# Patient Record
Sex: FEMALE | Race: BLACK OR AFRICAN AMERICAN | Employment: FULL TIME | URBAN - METROPOLITAN AREA
[De-identification: names, ages, dates, MRNs, and addresses within clinical notes are randomized per-mention and may not be internally consistent; named-entity substitution may affect disease eponyms.]

---

## 2022-02-22 ENCOUNTER — APPOINTMENT (OUTPATIENT)
Dept: CT IMAGING | Age: 62
End: 2022-02-22
Attending: STUDENT IN AN ORGANIZED HEALTH CARE EDUCATION/TRAINING PROGRAM
Payer: COMMERCIAL

## 2022-02-22 ENCOUNTER — HOSPITAL ENCOUNTER (EMERGENCY)
Age: 62
Discharge: HOME | End: 2022-02-22
Attending: STUDENT IN AN ORGANIZED HEALTH CARE EDUCATION/TRAINING PROGRAM
Payer: COMMERCIAL

## 2022-02-22 VITALS
DIASTOLIC BLOOD PRESSURE: 116 MMHG | RESPIRATION RATE: 16 BRPM | BODY MASS INDEX: 33.95 KG/M2 | SYSTOLIC BLOOD PRESSURE: 202 MMHG | OXYGEN SATURATION: 98 % | HEART RATE: 72 BPM | HEIGHT: 68 IN | TEMPERATURE: 98 F | WEIGHT: 224 LBS

## 2022-02-22 DIAGNOSIS — R10.11 ABDOMINAL PAIN, RIGHT UPPER QUADRANT: Primary | ICD-10-CM

## 2022-02-22 LAB
ALBUMIN SERPL-MCNC: 3.3 G/DL (ref 3.4–5)
ALBUMIN/GLOB SERPL: 0.7 {RATIO} (ref 0.8–1.7)
ALP SERPL-CCNC: 109 U/L (ref 45–117)
ALT SERPL-CCNC: 16 U/L (ref 13–56)
ANION GAP SERPL CALC-SCNC: 4 MMOL/L (ref 3–18)
AST SERPL-CCNC: 13 U/L (ref 10–38)
ATRIAL RATE: 69 BPM
BASOPHILS # BLD: 0 K/UL (ref 0–0.1)
BASOPHILS NFR BLD: 0 % (ref 0–2)
BILIRUB SERPL-MCNC: 0.5 MG/DL (ref 0.2–1)
BUN SERPL-MCNC: 14 MG/DL (ref 7–18)
BUN/CREAT SERPL: 21 (ref 12–20)
CALCIUM SERPL-MCNC: 8.8 MG/DL (ref 8.5–10.1)
CALCULATED P AXIS, ECG09: 33 DEGREES
CALCULATED R AXIS, ECG10: 19 DEGREES
CHLORIDE SERPL-SCNC: 108 MMOL/L (ref 100–111)
CO2 SERPL-SCNC: 28 MMOL/L (ref 21–32)
CREAT SERPL-MCNC: 0.68 MG/DL (ref 0.6–1.3)
DIAGNOSIS, 93000: NORMAL
DIFFERENTIAL METHOD BLD: NORMAL
EOSINOPHIL # BLD: 0.1 K/UL (ref 0–0.4)
EOSINOPHIL NFR BLD: 2 % (ref 0–5)
ERYTHROCYTE [DISTWIDTH] IN BLOOD BY AUTOMATED COUNT: 13.3 % (ref 11.6–14.5)
GLOBULIN SER CALC-MCNC: 4.5 G/DL (ref 2–4)
GLUCOSE SERPL-MCNC: 92 MG/DL (ref 74–99)
HCT VFR BLD AUTO: 40.1 % (ref 35–45)
HGB BLD-MCNC: 13.5 G/DL (ref 12–16)
IMM GRANULOCYTES # BLD AUTO: 0 K/UL (ref 0–0.04)
IMM GRANULOCYTES NFR BLD AUTO: 0 % (ref 0–0.5)
LIPASE SERPL-CCNC: 59 U/L (ref 73–393)
LYMPHOCYTES # BLD: 2.3 K/UL (ref 0.9–3.6)
LYMPHOCYTES NFR BLD: 41 % (ref 21–52)
MCH RBC QN AUTO: 27.4 PG (ref 24–34)
MCHC RBC AUTO-ENTMCNC: 33.7 G/DL (ref 31–37)
MCV RBC AUTO: 81.3 FL (ref 78–100)
MONOCYTES # BLD: 0.4 K/UL (ref 0.05–1.2)
MONOCYTES NFR BLD: 8 % (ref 3–10)
NEUTS SEG # BLD: 2.7 K/UL (ref 1.8–8)
NEUTS SEG NFR BLD: 49 % (ref 40–73)
NRBC # BLD: 0 K/UL (ref 0–0.01)
NRBC BLD-RTO: 0 PER 100 WBC
P-R INTERVAL, ECG05: 210 MS
PLATELET # BLD AUTO: 398 K/UL (ref 135–420)
PMV BLD AUTO: 11 FL (ref 9.2–11.8)
POTASSIUM SERPL-SCNC: 4 MMOL/L (ref 3.5–5.5)
PROT SERPL-MCNC: 7.8 G/DL (ref 6.4–8.2)
Q-T INTERVAL, ECG07: 446 MS
QRS DURATION, ECG06: 82 MS
QTC CALCULATION (BEZET), ECG08: 477 MS
RBC # BLD AUTO: 4.93 M/UL (ref 4.2–5.3)
SODIUM SERPL-SCNC: 140 MMOL/L (ref 136–145)
TROPONIN-HIGH SENSITIVITY: 10 NG/L (ref 0–54)
TROPONIN-HIGH SENSITIVITY: 9 NG/L (ref 0–54)
VENTRICULAR RATE, ECG03: 69 BPM
WBC # BLD AUTO: 5.6 K/UL (ref 4.6–13.2)

## 2022-02-22 PROCEDURE — 93005 ELECTROCARDIOGRAM TRACING: CPT

## 2022-02-22 PROCEDURE — 80053 COMPREHEN METABOLIC PANEL: CPT

## 2022-02-22 PROCEDURE — 74177 CT ABD & PELVIS W/CONTRAST: CPT

## 2022-02-22 PROCEDURE — 99283 EMERGENCY DEPT VISIT LOW MDM: CPT

## 2022-02-22 PROCEDURE — 83690 ASSAY OF LIPASE: CPT

## 2022-02-22 PROCEDURE — 85025 COMPLETE CBC W/AUTO DIFF WBC: CPT

## 2022-02-22 PROCEDURE — 84484 ASSAY OF TROPONIN QUANT: CPT

## 2022-02-22 PROCEDURE — 74011000636 HC RX REV CODE- 636: Performed by: STUDENT IN AN ORGANIZED HEALTH CARE EDUCATION/TRAINING PROGRAM

## 2022-02-22 RX ORDER — ERGOCALCIFEROL 1.25 MG/1
50000 CAPSULE ORAL
COMMUNITY

## 2022-02-22 RX ORDER — METHOCARBAMOL 750 MG/1
750 TABLET, FILM COATED ORAL
Qty: 14 TABLET | Refills: 0 | Status: SHIPPED | OUTPATIENT
Start: 2022-02-22 | End: 2022-03-08

## 2022-02-22 RX ORDER — LIDOCAINE 50 MG/G
PATCH TOPICAL
Qty: 14 EACH | Refills: 0 | Status: SHIPPED | OUTPATIENT
Start: 2022-02-22 | End: 2022-03-29

## 2022-02-22 RX ADMIN — IOPAMIDOL 100 ML: 612 INJECTION, SOLUTION INTRAVENOUS at 12:32

## 2022-02-22 NOTE — ED PROVIDER NOTES
EMERGENCY DEPARTMENT HISTORY AND PHYSICAL EXAM      Date: 2/22/2022  Patient Name: Mariza Moore    History of Presenting Illness     Chief Complaint   Patient presents with    Abdominal Pain       History (Context): Marzia Moore is a 64 y.o. female with a past medical history significant for pretension right upper quadrant abdominal pain. Patient states abdominal pain began this morning. She denies any inciting event for her symptoms. She states pain described as sharp, nonradiating, and without any alleviating or exacerbating factors. Patient states he is never had pain like this previously. She was seen at an urgent care and was told to come to the emergency department for possible comes into the ED today due to cholecystitis. Patient denies any fever, chills, chest pain, dyspnea, nausea, vomiting, or diarrhea. She states she is not take any medication for treatment of symptoms prior to arrival.  Patient describes her symptoms as severe initially however has improved since onset      PCP: CARTER Nieves    Current Outpatient Medications   Medication Sig Dispense Refill    ergocalciferol (Vitamin D2) 1,250 mcg (50,000 unit) capsule Take 50,000 Units by mouth every seven (7) days.  amLODIPine (NORVASC) 5 mg tablet Take 5 mg by mouth daily. Past History     Past Medical History:   Past Medical History:   Diagnosis Date    Hypertension        Past Surgical History:  History reviewed. No pertinent surgical history. Family History:  History reviewed. No pertinent family history. Social History:   Social History     Tobacco Use    Smoking status: Never Smoker    Smokeless tobacco: Never Used   Substance Use Topics    Alcohol use: No    Drug use: No       Allergies: Allergies   Allergen Reactions    Codeine Swelling       PMH, PSH, family history, social history, allergies reviewed with the patient with significant items noted above.   Review of Systems   Review of Systems   Constitutional: Negative for chills and fever. HENT: Negative for sore throat. Eyes: Negative for visual disturbance. Respiratory: Negative for shortness of breath. Cardiovascular: Negative for chest pain. Gastrointestinal: Positive for abdominal pain. Negative for nausea and vomiting. Genitourinary: Negative for difficulty urinating. Musculoskeletal: Negative for myalgias. Skin: Negative for rash. Neurological: Negative for headaches. Physical Exam     Vitals:    02/22/22 1103   BP: (!) 202/116   Pulse: 72   Resp: 16   Temp: 98 °F (36.7 °C)   SpO2: 98%   Weight: 101.6 kg (224 lb)   Height: 5' 7.5\" (1.715 m)       Physical Exam  Vitals and nursing note reviewed. Constitutional:       General: She is not in acute distress. Appearance: Normal appearance. She is not ill-appearing or toxic-appearing. HENT:      Head: Normocephalic and atraumatic. Mouth/Throat:      Mouth: Mucous membranes are moist.   Eyes:      General: No scleral icterus. Conjunctiva/sclera: Conjunctivae normal.   Cardiovascular:      Rate and Rhythm: Normal rate and regular rhythm. Comments: Normal peripheral perfusion  Pulmonary:      Effort: Pulmonary effort is normal.      Breath sounds: Normal breath sounds. Abdominal:      General: There is no distension. Palpations: Abdomen is soft. Tenderness: There is abdominal tenderness (Mild right upper quadrant tenderness to palpation). There is no guarding or rebound. Comments: Negative Revaes's and McBurney's   Musculoskeletal:         General: No deformity. Normal range of motion. Cervical back: Normal range of motion and neck supple. Skin:     General: Skin is warm and dry. Findings: No rash. Neurological:      General: No focal deficit present. Mental Status: She is alert and oriented to person, place, and time. Mental status is at baseline.    Psychiatric:         Mood and Affect: Mood normal. Thought Content: Thought content normal.         Diagnostic Study Results     Labs -     Recent Results (from the past 12 hour(s))   EKG, 12 LEAD, INITIAL    Collection Time: 02/22/22 11:32 AM   Result Value Ref Range    Ventricular Rate 69 BPM    Atrial Rate 69 BPM    P-R Interval 210 ms    QRS Duration 82 ms    Q-T Interval 446 ms    QTC Calculation (Bezet) 477 ms    Calculated P Axis 33 degrees    Calculated R Axis 19 degrees    Diagnosis       Sinus rhythm with 1st degree AV block  Minimal voltage criteria for LVH, may be normal variant  Cannot rule out Anterior infarct , age undetermined  Abnormal ECG  No previous ECGs available  Confirmed by Humberto Rosales MD, --- ([id number]) on 2/22/2022 12:59:55 PM     CBC WITH AUTOMATED DIFF    Collection Time: 02/22/22 11:35 AM   Result Value Ref Range    WBC 5.6 4.6 - 13.2 K/uL    RBC 4.93 4.20 - 5.30 M/uL    HGB 13.5 12.0 - 16.0 g/dL    HCT 40.1 35.0 - 45.0 %    MCV 81.3 78.0 - 100.0 FL    MCH 27.4 24.0 - 34.0 PG    MCHC 33.7 31.0 - 37.0 g/dL    RDW 13.3 11.6 - 14.5 %    PLATELET 585 851 - 739 K/uL    MPV 11.0 9.2 - 11.8 FL    NRBC 0.0 0  WBC    ABSOLUTE NRBC 0.00 0.00 - 0.01 K/uL    NEUTROPHILS 49 40 - 73 %    LYMPHOCYTES 41 21 - 52 %    MONOCYTES 8 3 - 10 %    EOSINOPHILS 2 0 - 5 %    BASOPHILS 0 0 - 2 %    IMMATURE GRANULOCYTES 0 0.0 - 0.5 %    ABS. NEUTROPHILS 2.7 1.8 - 8.0 K/UL    ABS. LYMPHOCYTES 2.3 0.9 - 3.6 K/UL    ABS. MONOCYTES 0.4 0.05 - 1.2 K/UL    ABS. EOSINOPHILS 0.1 0.0 - 0.4 K/UL    ABS. BASOPHILS 0.0 0.0 - 0.1 K/UL    ABS. IMM.  GRANS. 0.0 0.00 - 0.04 K/UL    DF AUTOMATED     METABOLIC PANEL, COMPREHENSIVE    Collection Time: 02/22/22 11:35 AM   Result Value Ref Range    Sodium 140 136 - 145 mmol/L    Potassium 4.0 3.5 - 5.5 mmol/L    Chloride 108 100 - 111 mmol/L    CO2 28 21 - 32 mmol/L    Anion gap 4 3.0 - 18 mmol/L    Glucose 92 74 - 99 mg/dL    BUN 14 7.0 - 18 MG/DL    Creatinine 0.68 0.6 - 1.3 MG/DL    BUN/Creatinine ratio 21 (H) 12 - 20      GFR est AA >60 >60 ml/min/1.73m2    GFR est non-AA >60 >60 ml/min/1.73m2    Calcium 8.8 8.5 - 10.1 MG/DL    Bilirubin, total 0.5 0.2 - 1.0 MG/DL    ALT (SGPT) 16 13 - 56 U/L    AST (SGOT) 13 10 - 38 U/L    Alk. phosphatase 109 45 - 117 U/L    Protein, total 7.8 6.4 - 8.2 g/dL    Albumin 3.3 (L) 3.4 - 5.0 g/dL    Globulin 4.5 (H) 2.0 - 4.0 g/dL    A-G Ratio 0.7 (L) 0.8 - 1.7     TROPONIN-HIGH SENSITIVITY    Collection Time: 02/22/22 11:35 AM   Result Value Ref Range    Troponin-High Sensitivity 10 0 - 54 ng/L   LIPASE    Collection Time: 02/22/22 11:35 AM   Result Value Ref Range    Lipase 59 (L) 73 - 393 U/L      Labs Reviewed   METABOLIC PANEL, COMPREHENSIVE - Abnormal; Notable for the following components:       Result Value    BUN/Creatinine ratio 21 (*)     Albumin 3.3 (*)     Globulin 4.5 (*)     A-G Ratio 0.7 (*)     All other components within normal limits   LIPASE - Abnormal; Notable for the following components:    Lipase 59 (*)     All other components within normal limits   CBC WITH AUTOMATED DIFF   TROPONIN-HIGH SENSITIVITY   TROPONIN-HIGH SENSITIVITY       Radiologic Studies -   CT ABD PELV W CONT   Final Result      No explanation for the patient's pain. Hepatic steatosis. Mildly enlarged lymph node mely hepatis of uncertain significance, likely   normal for this patient. Fibroid uterus. Possible nodule left breast asymmetric when compared with the   right side. Question is the patient current on her breast cancer screening? If   not, advise diagnostic mammogram and ultrasound to further evaluate. No   mammograms in the PACS system for correlation. CT Results  (Last 48 hours)               02/22/22 1231  CT ABD PELV W CONT Final result    Impression:      No explanation for the patient's pain. Hepatic steatosis. Mildly enlarged lymph node mely hepatis of uncertain significance, likely   normal for this patient. Fibroid uterus.  Possible nodule left breast asymmetric when compared with the   right side. Question is the patient current on her breast cancer screening? If   not, advise diagnostic mammogram and ultrasound to further evaluate. No   mammograms in the PACS system for correlation. Narrative:  CT of the Abdomen and Pelvis With Contrast       CPT CODE: 84899       CLINICAL HISTORY: Left upper quadrant abdominal pain. Normal white blood cell   count. .       TECHNIQUE: After administration of oral and nonionic intravenous contrast, 5 mm   helical scan was obtained of the abdomen and pelvis. Sagittal and coronal   reformations then created with the original data set. All CT scans at this   facility are performed using dose optimization techniques as appropriate to a   performed exam, to include automated exposure control, adjustment of the mA   and/or kV according to patient's size (including appropriate matching for site   specific examinations), or use of iterative reconstruction technique. COMPARISON:       FINDINGS:        Lung bases: Clear. No effusions. Heart and pericardium: Normal.        Liver: Overall low in attenuation. No focal lesions No focal lesions. Gallbladder/biliary: No calcified stones or wall thickening. Spleen: Normal density and size. No focal lesions. Pancreas: Normal enhancement. No duct dilatation. Adrenals: Normal.        Kidneys:  Normally enhancing. No focal lesions. No striated nephrogram. No   hydronephrosis or nephrolithiasis. Retroperitoneum: Great vessels unremarkable. Lymph nodes: There is enlarged lymph node at the mely hepatis measuring 14 mm. Portacaval node measures 8 mm. No retroperitoneal or mesenteric adenopathy. No   pelvic or groin. Bowel: Stomach, small bowel, appendix are normal.  Colon is normal.        Peritoneal space: No free fluid. :  Myometrial lesion left side uterus measures 24 mm.  Exophytic myometrial   lesion right-sided uterus with claw of surrounding uterine tissue also   compatible with leiomyoma. Third myometrial lesion at the dome measures 17 mm. No adnexal masses. Urinary bladder is normal.       Abdominal wall/MSK: No hernias. No acute abnormalities at skeleton. Low chest   wall/ribs intact. CXR Results  (Last 48 hours)    None          The laboratory results, imaging results, and other diagnostic exams were reviewed in the EMR. Medical Decision Making   I am the first provider for this patient. I reviewed the vital signs, available nursing notes, past medical history, past surgical history, family history and social history. Vital Signs-Reviewed the patient's vital signs. ED EKG interpretation:  Rhythm: normal sinus rhythm; and regular . Rate (approx.): 69; Axis: normal; P wave: prolonged; QRS interval: normal ; ST/T wave: non-specific changes; Other findings: abnormal ekg. This EKG was interpreted by Sukhdeep Ba D.O. Records Reviewed: Personally, on initial evaluation    MDM:   Dorita Ano presents with complaint of right upper quadrant abdominal pain  DDX includes but is not limited to: Cholecystitis, abdominal pain, appendicitis, muscle strain    Patient over well-appearing, no acute distress, vital signs grossly within normal limits. Will obtain lab work and imaging for further evaluation of patients complaint. Will continue to monitor and evaluate patient while in the ED. Orders as below:  Orders Placed This Encounter    CT ABD PELV W CONT    CBC WITH AUTOMATED DIFF    COMPREHENSIVE METABOLIC PANEL    TROPONIN-HIGH SENSITIVITY    LIPASE    TROPONIN-HIGH SENSITIVITY    EKG, 12 LEAD, INITIAL    ergocalciferol (Vitamin D2) 1,250 mcg (50,000 unit) capsule    iopamidoL (ISOVUE 300) 61 % contrast injection 100 mL        ED Course:   ED Course as of 02/22/22 1444   Tue Feb 22, 2022   1254 Patient CT scan does not show any acute intra-abdominal abnormalities.   Fibroid uterus with a possible nodule to the left breast when compared to the right side. We will continue to monitor patient. [DV]   1146 Patient's repeat troponin stable. Will discharge patient home with return precautions and follow-up recommendations. Patient verbalized understanding is without any further questions. [DV]      ED Course User Index  [DV] Elvin Resendez DO           Procedures:  Procedures        Diagnosis and Disposition     CLINICAL IMPRESSION:  No diagnosis found. Current Discharge Medication List          Disposition: Home    Patient condition at time of disposition: Stable    DISCHARGE NOTE:   Pt has been reexamined. Patient has no new complaints, changes, or physical findings. Care plan outlined and precautions discussed. Results were reviewed with the patient. All medications were reviewed with the patient. All of pt's questions and concerns were addressed. Alarm symptoms and return precautions associated with chief complaint and evaluation were reviewed with the patient in detail. The patient demonstrated adequate understanding. Patient was instructed to follow up with PCP, as well as strict return precautions to the ED upon further deterioration. Patient is ready to go home. The patient is happy with this plan          Dragon Disclaimer     Please note that this dictation was completed with Tutor Trove, the computer voice recognition software. Quite often unanticipated grammatical, syntax, homophones, and other interpretive errors are inadvertently transcribed by the computer software. Please disregard these errors. Please excuse any errors that have escaped final proofreading. Kamran GROMAN.

## 2022-02-22 NOTE — ED NOTES
Jose Moore is a 64 y.o. female that was discharged in stable condition. The patients diagnosis, condition and treatment were explained to  patient and aftercare instructions were given. The patient verbalized understanding. Patient armband removed and shredded.

## 2022-02-22 NOTE — ED TRIAGE NOTES
Patient c/o right upper quadrant abdominal pain that began on awakening today. She states being evaluated at [facility] and advised to report to ER for further evaluation of condition. She denies vomiting, diarrhea or constipation. States last bowel movement was yesterday.

## 2022-08-04 ENCOUNTER — HOSPITAL ENCOUNTER (OUTPATIENT)
Dept: PHYSICAL THERAPY | Age: 62
Discharge: HOME | End: 2022-08-04
Payer: COMMERCIAL

## 2022-08-04 PROCEDURE — 97530 THERAPEUTIC ACTIVITIES: CPT

## 2022-08-04 PROCEDURE — 97162 PT EVAL MOD COMPLEX 30 MIN: CPT

## 2022-08-04 PROCEDURE — 97110 THERAPEUTIC EXERCISES: CPT

## 2022-08-04 NOTE — PROGRESS NOTES
[facility] - Tanisha Serrano  [address] [facility]  [phone] fax    Plan of Care/ Statement of Necessity for Physical Therapy Services    Patient name: Thuy Gutierrez Start of Care: 2022   Referral source: No ref. 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 Assessment/ key information: Patient is a 68-year-old female who presents to therapy with chief complaint of knee pain and right sided weakness following CVA in 2022. She had home health therapy following. Patient reports when she stands or gets out of bed her knee will \"pop\" and her right side is sore. She reports navigating steps sideways with use of handrails. She reports an ache in her hip with sensation of it \"giving out\". Symptoms aggravated with prolonged standing (> 3 hours) and prolonged sitting (>10-15 min); symptoms reduced with walking slowly and ambulating with cart. Patient presents to therapy ambulating with SPC with increased lateral weight shift and knee flex B. She stands with increased left knee flex and right tibial ER. Patient demonstrates decreased right LE strength most noted with hip flex (3-/5) and knee flex (3+/5). She demonstrates decreased right knee AROM measured at 76 deg flex and -4 deg ext (vs 102 deg flex and -6 deg ext on right).  Min gastroc tightness on right; mod left gastroc and B hamstring tightness with pain on right. Min right knee edema noted measured at 1.5cm greater than left at joint line. Patient with TTP to medial gastroc head with tightness noted to lateral gastroc head. Anterior drawer, posterior drawer, and valgus/varus testing at 0 deg negative; min increased laxity noted with Lachman's test. Patient able to maintain MSR stance for 10 sec B. She navigates stairs with step to pattern with use of handrail at quarter turn to handrail to descend. Patient would benefit from skilled outpatient PT to address above mentioned deficits to return to prior level of function, increase independence with ADLs, and improve overall quality of life. Vasopnuematic compression justification:  Per bilateral girth measures taken and listed above the edema is considered significant and having an impact on the patient's strength, balance, gait, transfers, self care, and ADLs    Evaluation Complexity History HIGH Complexity :3+ comorbidities / personal factors will impact the outcome/ POC ; Examination HIGH Complexity : 4+ Standardized tests and measures addressing body structure, function, activity limitation and / or participation in recreation  ;Presentation MEDIUM Complexity : Evolving with changing characteristics  ; Clinical Decision Making MEDIUM Complexity : FOTO score of 26-74  Overall Complexity Rating: MEDIUM  Problem List: pain affecting function, decrease ROM, decrease strength, edema affecting function, impaired gait/ balance, decrease ADL/ functional abilitiies, decrease activity tolerance, decrease flexibility/ joint mobility, and decrease transfer abilities   Treatment Plan may include any combination of the following: Therapeutic exercise, Therapeutic activities, Neuromuscular re-education, Physical agent/modality, Gait/balance training, Manual therapy, Patient education, Self Care training, Functional mobility training, Home safety training, and Stair training  Patient / Family readiness to learn indicated by: asking questions, trying to perform skills, and interest  Persons(s) to be included in education: patient (P)  Barriers to Learning/Limitations: None  Patient Goal (s): to walk again on my own  Patient Self Reported Health Status: fair  Rehabilitation Potential: good    Short Term Goals: To be accomplished in 1 weeks:  1. Patient will report compliance with initial HEP to optimize therapy outcomes. Status at eval: established    Long Term Goals: To be accomplished in 6 weeks:  1. Patient will improve FOTO score by at least 5 points in order to demonstrate functional improvement. Status at eval: 63/100               2. Patient will report no more than \"little difficulty\" with \"standing for 1 hour\" with FOTO in order to demonstrate improved tolerance to work tasks. Status at eval: \"moderate difficulty\"   3. Patient will improve right hip and knee flex MMT to at least 4/5 with MMT in order to perform functional tasks with increased ease. Status at eval: hip flex 3-/4, knee flex 3+/5   4. Patient will improve right knee flex AROM to at least 95 deg in order to navigate stairs with increased ease. Status at eval: 76 deg   5. Patient will be able to maintain MSR stance for at least 30 sec B in order to perform self care tasks with increased ease. Status at eval: 10 sec B    Frequency / Duration: Patient to be seen 2-3 times per week for 6 weeks. Patient/  Caregiver education and instruction: Diagnosis, prognosis, self care, activity modification, and exercises   [x]  Plan of care has been reviewed with ALISON Rodriguez PT 8/4/2022 10:18 AM    ________________________________________________________________________    I certify that the above Therapy Services are being furnished while the patient is under my care. I agree with the treatment plan and certify that this therapy is necessary.     [address] [facility] Signature:____________Date:_________TIME:________     No ref.  provider found  ** Signature, Date and Time must be completed for valid certification **    Please sign and return to [facility] - [facility] [employer] [facility]  [address] [place] [address]  [phone] fax

## 2022-08-04 NOTE — PROGRESS NOTES
PT DAILY TREATMENT NOTE/KNEE EVAL     Patient Name: Julian Dowd  Date:2022  : 1960  [x]  Patient  Verified  Payor: Balbina Felipe / Plan: Vimal Ca / Product Type: HMO /    In time:12:49P  Out time:1:34P  Total Treatment Time (min): 45  Visit #: 1 of 18    Medicare/BCBS Only   Total Timed Codes (min):  25 1:1 Treatment Time:  45     Treatment Area: Right knee pain [M25.561]  Weakness of right lower extremity [R29.898]    SUBJECTIVE  Pain Level (0-10 scale): 6/10 (at worst 10/10, at best 0/10)  []constant []intermittent []improving []worsening []no change since onset    Any medication changes, allergies to medications, adverse drug reactions, diagnosis change, or new procedure performed?: [x] No    [] Yes (see summary sheet for update)  Subjective functional status/changes:       Comorbidities: HTN   Prior Level of Function: ambulating without AD; working as teacher for [employer]); lives in 1 [place] home with 3 steps to enter with son; enjoys doing community service with [facility]    The Plan of Care and following information is based on the information from the initial evaluation. Assessment/ key information: Patient is a 70-year-old female who presents to therapy with chief complaint of knee pain and right sided weakness following CVA in 2022. She had home health therapy following. Patient reports when she stands or gets out of bed her knee will \"pop\" and her right side is sore. She reports navigating steps sideways with use of handrails. She reports an ache in her hip with sensation of it \"giving out\". Symptoms aggravated with prolonged standing (> 3 hours) and prolonged sitting (>10-15 min); symptoms reduced with walking slowly and ambulating with cart. Patient presents to therapy ambulating with SPC with increased lateral weight shift and knee flex B. She stands with increased left knee flex and right tibial ER.  Patient demonstrates decreased right LE strength most noted with hip flex (3-/5) and knee flex (3+/5). She demonstrates decreased right knee AROM measured at 76 deg flex and -4 deg ext (vs 102 deg flex and -6 deg ext on right). Min gastroc tightness on right; mod left gastroc and B hamstring tightness with pain on right. Min right knee edema noted measured at 1.5cm greater than left at joint line. Patient with TTP to medial gastroc head with tightness noted to lateral gastroc head. Anterior drawer, posterior drawer, and valgus/varus testing at 0 deg negative; min increased laxity noted with Lachman's test. Patient able to maintain MSR stance for 10 sec B. She navigates stairs with step to pattern with use of handrail at quarter turn to handrail to descend. Patient would benefit from skilled outpatient PT to address above mentioned deficits to return to prior level of function, increase independence with ADLs, and improve overall quality of life.       25 min [x]Eval                  []Re-Eval       10 min Therapeutic Exercise:  [] See flow sheet : HEP review   Rationale: increase ROM, increase strength, improve coordination, improve balance, and increase proprioception to improve the patients ability to perform ADLs with increased ease    15 min Therapeutic Activity:  []  See flow sheet : patient education   Rationale: increase ROM, increase strength, improve coordination, improve balance, and increase proprioception  to improve the patients ability to perform ADLs with increased ease           With   [] TE   [x] TA   [] neuro   [] other: Patient Education: [x] Review HEP    [] Progressed/Changed HEP based on:   [] positioning   [] body mechanics   [] transfers   [] heat/ice application    [x] other: diagnosis, prognosis, POC, purpose and importance of therapy; anatomy and physiology as it relates to current condition; continued use of SPC due to increased weakness/instability; gradually increasing walking tolerance; skilled outpatient PT to address UE function Other Objective/Functional Measures:     Physical Therapy Evaluation - Knee    Posture: [] Varus    [] Valgus    [] Recurvatum        [] Tibial Torsion    [] Foot Supination    [] Foot Pronation    Describe:    Gait:  [] Normal    [] Abnormal    [] Antalgic    [] NWB    Device:    Describe:    ROM / Strength  [] Unable to assess                  AROM                      PROM                   Strength (1-5)    Left Right Left Right Left Right   Hip Flexion    3- 4     Extension          Abduction          Adduction         Knee Flexion 417 59  3+ 4+     Extension -6 -4  4+ 5    Ankle Plantarflexion (in seated)    4+ 5     Dorsiflexion    5 5    Right  hip ER 4 IR 4-  Left hip ER 4 IR 5    Flexibility: [] Unable to assess at this time  Hamstrings:    (L) Tightness= [] WNL   [] Min   [] Mod   [] Severe    (R) Tightness= [] WNL   [] Min   [] Mod   [] Severe  Quadriceps:    (L) Tightness= [] WNL   [] Min   [x] Mod   [] Severe    (R) Tightness= [] WNL   [] Min   [x] Mod (with pain)  [] Severe  Gastroc:      (L) Tightness= [] WNL   [] Min   [x] Mod   [] Severe    (R) Tightness= [] WNL   [x] Min   [] Mod   [] Severe  Other:    Palpation:   Neg/Pos  Neg/Pos  Neg/Pos   Joint Line  Quad tendon  Patellar ligament    Patella  Fibular head  Pes Anserinus    Tibial tubercle  Hamstring tendons  Infrapatellar fat pad      Optional Tests:  Patellar Positioning (Static)   []L []R Normal []L []R Lateral   []L []R Ricardo Cunas      []L []R Medial   []L []R [place]    Patellar Tracking   []L []R Glide (Lat)   []L []R Tilt (Lat)     []L []R Glide (Med)  []L []R Tilt (Med)      []L []R Tile (Inf)     Patellar Mobility   []L []R Hypermobile []L []R Hypomobile         Girth Measurements:     Cm at  Joint line  Cm above joint line   Cm at   Cm below joint line  Cm at joint line   Left 48.5       Right  50         Lachmans  [x] Neg    [] Pos Posterior Drawer [x] Neg    [] Pos  Pivot Shift  [] Neg    [] Pos Posterior Sag  [] Neg    [] Pos  NEVIN   [] Neg    [] Pos Sally's Test [] Neg    [] Pos  ALRI   [] Neg    [] Pos Squat   [] Neg    [] Pos  Valgus@ 0 Degrees [x] Neg    [] Pos Isaak-Sami [] Neg    [] Pos  Valgus@ 30 Degrees [] Neg    [] Pos Patellar Apprehension [] Neg    [] Pos  Varus@ 0 Degrees [x] Neg    [] Pos Chavez's Compression [] Neg    [] Pos  Varus@ 30 Degrees [] Neg    [] Pos Ely's Test  [] Neg    [] Pos  Apley's Compression [] Neg    [] Pos Vladimir's Test  [] Neg    [] Pos  Apley's Distraction [] Neg    [] Pos Stroke Test  [] Neg    [] Pos   Anterior Drawer [x] Neg    [] Pos Fluctuation Test [] Neg    [] Pos  Other:                  [] Neg    [] Pos                 Other tests/comments:       Gait: increased lateral weight shift B  Stairs: step to pattern and unilateral handrail; quarter turn to handrail to descent  Posture: increased left knee flex and right tibial ER  MSR: 10 sec B  Pain to right lateral thigh with active knee flex in supine  Tenderness to medial gastroc head, tightness to lateral gastroc head  Pain to inner thigh/knee with ABHINAV  Increased laxity with Lachmans on right        Pain Level (0-10 scale) post treatment: 3-4/10    ASSESSMENT/Changes in Function: See POC    Patient will continue to benefit from skilled PT services to modify and progress therapeutic interventions, address functional mobility deficits, address ROM deficits, address strength deficits, analyze and address soft tissue restrictions, analyze and cue movement patterns, analyze and modify body mechanics/ergonomics, assess and modify postural abnormalities, address imbalance/dizziness, and instruct in home and community integration to attain remaining goals.      [x]  See Plan of Care  []  See progress note/recertification  []  See Discharge Summary         Progress towards goals / Updated goals:  See POC    PLAN  []  Upgrade activities as tolerated     []  Continue plan of care  []  Update interventions per flow sheet       []  Discharge due to:_  [] Other:_      Carmen Hoang, PT 8/4/2022  10:18 AM

## 2022-08-09 ENCOUNTER — HOSPITAL ENCOUNTER (OUTPATIENT)
Dept: PHYSICAL THERAPY | Age: 62
Discharge: HOME | End: 2022-08-09
Payer: COMMERCIAL

## 2022-08-09 PROCEDURE — 97112 NEUROMUSCULAR REEDUCATION: CPT

## 2022-08-09 PROCEDURE — 97110 THERAPEUTIC EXERCISES: CPT

## 2022-08-09 NOTE — PROGRESS NOTES
PT DAILY TREATMENT NOTE     Patient Name: Valerie Victoria  Date:2022  : 1960  [x]  Patient  Verified  Payor: Kelley Mark / Plan: Raya Knight / Product Type: HMO /    In time:1035  Out time:1115  Total Treatment Time (min): 40  Visit #: 2 of         Treatment Area: Right knee pain [M25.561]  Weakness of right lower extremity [R29.898]    SUBJECTIVE  Pain Level (0-10 scale): 0/10  Any medication changes, allergies to medications, adverse drug reactions, diagnosis change, or new procedure performed?: [x] No    [] Yes (see summary sheet for update)  Subjective functional status/changes:   [] No changes reported  Reports she recently has her BP meds increased due to david BP levels. OBJECTIVE       30 min Therapeutic Exercise:  [] See flow sheet :   Rationale: increase ROM, increase strength, improve coordination, and improve balance to improve the patients ability to ease with adl's    10 min Neuromuscular Re-education:  []  See flow sheet :   Rationale: increase ROM, increase strength, improve coordination, improve balance, and increase proprioception  to improve the patients ability to ease with adl's    Rationale: With   [] TE   [] TA   [] neuro   [] other: Patient Education: [x] Review HEP    [] Progressed/Changed HEP based on:   [] positioning   [] body mechanics   [] transfers   [] heat/ice application    [] other:      Other Objective/Functional Measures:    MT=872/120 (took BP meds prior to PT and was recently adjusted. )  SBA/CGA for all balance ex's. Pt continues to have LE weakness as observed with left >right LE shaking with FWB, however right LE weakness is significant. Right HS=trace strength  Left HS=4/5  2 rest breaks required due to fatigue. Pain Level (0-10 scale) post treatment: 0/10    ASSESSMENT/Changes in Function: Progressing well. Pt reports she is doing HEP. Pt recommended to check BP levels 2x/day and document it.  Pt understands when BP levels are high, she will not likely be seen. Patient will continue to benefit from skilled PT services to modify and progress therapeutic interventions, address functional mobility deficits, address ROM deficits, address strength deficits, analyze and address soft tissue restrictions, analyze and cue movement patterns, analyze and modify body mechanics/ergonomics, assess and modify postural abnormalities, address imbalance/dizziness, and instruct in home and community integration to attain remaining goals. []  See Plan of Care  [x]  See progress note/recertification  []  See Discharge Summary         Progress towards goals / Updated goals:  Short Term Goals: To be accomplished in 1 weeks:  1. Patient will report compliance with initial HEP to optimize therapy outcomes. Status at eval: established     Long Term Goals: To be accomplished in 6 weeks:  1. Patient will improve FOTO score by at least 5 points in order to demonstrate functional improvement. Status at eval: 63/100               2. Patient will report no more than \"little difficulty\" with \"standing for 1 hour\" with FOTO in order to demonstrate improved tolerance to work tasks. Status at eval: \"moderate difficulty\"               3. Patient will improve right hip and knee flex MMT to at least 4/5 with MMT in order to perform functional tasks with increased ease. Status at eval: hip flex 3-/4, knee flex 3+/5               4. Patient will improve right knee flex AROM to at least 95 deg in order to navigate stairs with increased ease. Status at eval: 76 deg               5. Patient will be able to maintain MSR stance for at least 30 sec B in order to perform self care tasks with increased ease.                             Status at eval: 10 sec B    PLAN  [x]  Upgrade activities as tolerated     [x]  Continue plan of care  []  Update interventions per flow sheet       []  Discharge due to:_  []  Other:_      Cele Hernandez, PT 8/9/2022  10:38 AM    Future Appointments   Date Time Provider Gracie Mckeon   8/12/2022  3:45 PM Teresita Rios, PT [facility]   8/16/2022 10:30 AM Dane Gregoria, PT [facility]   8/19/2022  4:30 PM Teresita Rios, PT [facility]   8/22/2022 11:15 AM Dane Gregoria, PT [facility]   8/26/2022  4:30 PM Dane Gregoria, PT [facility]   8/29/2022  3:00 PM Teresita Rios, PT [facility]   8/31/2022  5:15 PM Dane Gregoria, PT [facility]   9/6/2022  6:00 PM Dane Gregoria, PT [facility]   9/8/2022  6:00 PM Teresita Rios, [place] [facility]   9/13/2022  6:00 PM Dane Gregoria, PT [facility]   9/14/2022  5:15 PM Dane Gregoria, PT [facility]   9/19/2022  5:15 PM Dane Gregoria, PT [facility]   9/21/2022  5:15 PM Dane Gregoria, PT [facility]

## 2022-08-12 ENCOUNTER — APPOINTMENT (OUTPATIENT)
Dept: PHYSICAL THERAPY | Age: 62
End: 2022-08-12
Payer: COMMERCIAL

## 2022-08-16 ENCOUNTER — HOSPITAL ENCOUNTER (OUTPATIENT)
Dept: PHYSICAL THERAPY | Age: 62
Discharge: HOME | End: 2022-08-16
Payer: COMMERCIAL

## 2022-08-16 PROCEDURE — 97112 NEUROMUSCULAR REEDUCATION: CPT

## 2022-08-16 PROCEDURE — 97110 THERAPEUTIC EXERCISES: CPT

## 2022-08-16 NOTE — PROGRESS NOTES
PT DAILY TREATMENT NOTE - [facility]     Patient Name: Burt Albarado  Date:2022  : 1960  [x]  Patient  Verified  Payor: Royal Roberto / Plan: Alec Stamikeer / Product Type: HMO /    In time:1040  Out time:1120  Total Treatment Time (min): 40  Visit #: 3 of     Treatment Area: Right knee pain [M25.561]  Weakness of right lower extremity [R29.898]    SUBJECTIVE  Pain Level (0-10 scale): 0/10  Any medication changes, allergies to medications, adverse drug reactions, diagnosis change, or new procedure performed?: [x] No    [] Yes (see summary sheet for update)  Subjective functional status/changes:   [] No changes reported  Pt reports she has been taking irteasy and relaxing. She continues to feel off balanced and right knee weakness. OBJECTIVE      25 min Therapeutic Exercise:  [] See flow sheet :   Rationale: increase ROM, increase strength, improve coordination, and improve balance to improve the patients ability to ease withadl's     15 min Neuromuscular Re-education:  []  See flow sheet :   Rationale: increase ROM, increase strength, improve coordination, and improve balance  to improve the patients ability to ease with adl's    Rationale: With   [] TE   [] TA   [] neuro   [] other: Patient Education: [x] Review HEP    [] Progressed/Changed HEP based on:   [] positioning   [] body mechanics   [] transfers   [] heat/ice application    [] other:      Other Objective/Functional Measures: Decreased balance strategies on right LE as observed during MSR/ Right back.   -rest breaks required due to fatigue onset. -cues to keep foot pointed straight during standing hip ex's. -   Pain Level (0-10 scale) post treatment: 0/10    ASSESSMENT/Changes in Function: Progressing well. 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 Issued HEP HS curls and hip abd with band.     Patient will continue to benefit from skilled PT services to modify and progress therapeutic interventions, address functional mobility deficits, address ROM deficits, address strength deficits, analyze and address soft tissue restrictions, analyze and cue movement patterns, analyze and modify body mechanics/ergonomics, assess and modify postural abnormalities, address imbalance/dizziness, and instruct in home and community integration to attain remaining goals. [x]  See Plan of Care  []  See progress note/recertification  []  See Discharge Summary         Progress towards goals / Updated goals:  Short Term Goals: To be accomplished in 1 weeks:  1. Patient will report compliance with initial HEP to optimize therapy outcomes. Status at eval: established  Current: Met. Long Term Goals: To be accomplished in 6 weeks:  1. Patient will improve FOTO score by at least 5 points in order to demonstrate functional improvement. Status at eval: 63/100               2. Patient will report no more than \"little difficulty\" with \"standing for 1 hour\" with FOTO in order to demonstrate improved tolerance to work tasks. Status at eval: \"moderate difficulty\"               3. Patient will improve right hip and knee flex MMT to at least 4/5 with MMT in order to perform functional tasks with increased ease. Status at eval: hip flex 3-/4, knee flex 3+/5               4. Patient will improve right knee flex AROM to at least 95 deg in order to navigate stairs with increased ease. Status at eval: 76 deg               5. Patient will be able to maintain MSR stance for at least 30 sec B in order to perform self care tasks with increased ease.                             Status at eval: 10 sec B   PLAN  [x]  Upgrade activities as tolerated     [x]  Continue plan of care  []  Update interventions per flow sheet       []  Discharge due to:_  []  Other:_      Bunnie Hammans, PT 8/16/2022  10:35 AM    Future Appointments   Date Time Provider Gracie Linda   8/19/2022  4:30 PM Boubacar Lai [facility]   8/22/2022 11:15 AM Knott Gray, PT [facility]   8/26/2022  4:30 PM Knott Gray, PT [facility]   8/29/2022  3:00 PM Boubacar Lai [facility]   8/31/2022  5:15 PM Knott Gray, PT [facility]   9/6/2022  6:00 PM Luis Miguel Gray, PT [facility]   9/8/2022  6:00 PM Boubacar Lai [facility]   9/12/2022  2:30 PM [facility]   9/13/2022  6:00 PM Knott Gray, PT [facility]   9/14/2022  5:15 PM Knott Gray, PT [facility]   9/19/2022  5:15 PM Knott Gray, PT [facility]   9/21/2022  5:15 PM Knott Gray, PT [facility]

## 2022-08-19 ENCOUNTER — HOSPITAL ENCOUNTER (OUTPATIENT)
Dept: PHYSICAL THERAPY | Age: 62
Discharge: HOME | End: 2022-08-19
Payer: COMMERCIAL

## 2022-08-19 PROCEDURE — 97110 THERAPEUTIC EXERCISES: CPT

## 2022-08-19 NOTE — PROGRESS NOTES
PT DAILY TREATMENT NOTE     Patient Name: Kiran Lopez  Date:2022  : 1960  [x]  Patient  Verified  Payor: Maggy Found / Plan: Dulce Cooler / Product Type: HMO /    In time:3:00P  Out time:3:49P  Total Treatment Time (min): 49  Visit #: 4 of     Medicare/BCBS Only   Total Timed Codes (min):  49 1:1 Treatment Time:  40       Treatment Area: Right knee pain [M25.561]  Weakness of right lower extremity [R29.898]    SUBJECTIVE  Pain Level (0-10 scale): 8/10 right great toe  Any medication changes, allergies to medications, adverse drug reactions, diagnosis change, or new procedure performed?: [x] No    [] Yes (see summary sheet for update)  Subjective functional status/changes:   [] No changes reported  Patient reports her right knee is ok but she has had more cramping in her right foot since she woke up. The cramping is on top of her big toe. Patient reports she feels she can stand a little more. She tried doing things at home without her cane but she feels weaker without it. She may have something removed from her breast but will not know until next week. She just wants to work on standing and moving on her right side. 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      OBJECTIVE      49 min Therapeutic Exercise:  [x] See flow sheet :   Rationale: increase ROM, increase strength, improve coordination, improve balance, and increase proprioception to improve the patients ability to perform ADLs with increased ease                With   [] TE   [] TA   [] neuro   [] other: Patient Education: [x] Review HEP    [] Progressed/Changed HEP based on:   [] positioning   [] body mechanics   [] transfers   [] heat/ice application    [] other:      Other Objective/Functional Measures:     /78  Good stability with static standing balance activities   Mod sway with normal stance on foam EC on foam  Educated on needing physician clearance following upcoming surgery if having procedure under general anesthesia  Standing marches and hip abd performed with unilateral UE support; B UE support for standing hip ext   Reviewed great toe flex and ankle DF stretch to decrease toe/foot pain    Pain Level (0-10 scale) post treatment: 4/10    ASSESSMENT/Changes in Function: Patient making slow, steady progress towards goals. She demonstrates good overalls stability with static standing balance. She continues to stand with increased B knee flex and right tibial ER and ambulate with lack of right TKE. Will continue to progress as able. Patient will continue to benefit from skilled PT services to modify and progress therapeutic interventions, address functional mobility deficits, address ROM deficits, address strength deficits, analyze and address soft tissue restrictions, analyze and cue movement patterns, analyze and modify body mechanics/ergonomics, assess and modify postural abnormalities, address imbalance/dizziness, and instruct in home and community integration to attain remaining goals. Progress towards goals / Updated goals:  Short Term Goals: To be accomplished in 1 weeks:  1. Patient will report compliance with initial HEP to optimize therapy outcomes. Status at eval: established  Current: Met. Long Term Goals: To be accomplished in 6 weeks:  1. Patient will improve FOTO score by at least 5 points in order to demonstrate functional improvement. Status at eval: 63/100               2. Patient will report no more than \"little difficulty\" with \"standing for 1 hour\" with FOTO in order to demonstrate improved tolerance to work tasks. Status at eval: \"moderate difficulty\"               3. Patient will improve right hip and knee flex MMT to at least 4/5 with MMT in order to perform functional tasks with increased ease.                             Status at eval: hip flex 3-/4, knee flex 3+/5               4. Patient will improve right knee flex AROM to at least 95 deg in order to navigate stairs with increased ease. Status at eval: 76 deg               5. Patient will be able to maintain MSR stance for at least 30 sec B in order to perform self care tasks with increased ease.                             Status at eval: 10 sec B    PLAN  []  Upgrade activities as tolerated     []  Continue plan of care  []  Update interventions per flow sheet       []  Discharge due to:_  []  Other:_      Martha Monk, PT 8/19/2022  8:54 AM    Future Appointments   Date Time Provider Gracie Mckeon   8/19/2022  4:30 PM Teresita Daugherty, PT [facility]   8/22/2022 11:15 AM Agustin Barbosa, PT [facility]   8/26/2022  4:30 PM Agustin Barbosa, PT [facility]   8/29/2022  3:00 PM Teresita Daugherty, PT [facility]   8/31/2022  5:15 PM Agustin Barbosa, PT [facility]   9/6/2022  6:00 PM Agustin Barbosa, PT [facility]   9/8/2022  6:00 PM Teresita Daugherty, [place] [facility]   9/12/2022  2:30 PM [facility]   9/13/2022  6:00 PM Agustin Barbosa, PT [facility]   9/14/2022  5:15 PM Agustin Barbosa, PT [facility]   9/19/2022  5:15 PM Agustin Barbosa, PT [facility]   9/21/2022  5:15 PM Agustin Barbosa, PT [facility]

## 2022-08-22 ENCOUNTER — HOSPITAL ENCOUNTER (OUTPATIENT)
Dept: PHYSICAL THERAPY | Age: 62
Discharge: HOME | End: 2022-08-22
Payer: COMMERCIAL

## 2022-08-22 PROCEDURE — 97110 THERAPEUTIC EXERCISES: CPT

## 2022-08-22 NOTE — PROGRESS NOTES
1121PT DAILY TREATMENT NOTE     Patient Name: Kallie Hernandes  Date:2022  : 1960  [x]  Patient  Verified  Payor: Ani Velarde / Plan: Eduarda Chou / Product Type: HMO /    In time:1121  Out time:1200  Total Treatment Time (min): 39  Visit #: 5 of 12    Medicare/BCBS Only   Total Timed Codes (min):  39 1:1 Treatment Time:  39       Treatment Area: Right knee pain [M25.561]  Weakness of right lower extremity [R29.898]    SUBJECTIVE  Pain Level (0-10 scale): 3/4 right knee pain  Any medication changes, allergies to medications, adverse drug reactions, diagnosis change, or new procedure performed?: [x] No    [] Yes (see summary sheet for update)  Subjective functional status/changes:   [] No changes reported  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 OBJECTIVE    []redness -   39 min Therapeutic Exercise:  [] See flow sheet :   Rationale: increase ROM, increase strength, improve coordination, and improve balance to improve the patients ability to ease with Adl's          With   [] TE   [] TA   [] neuro   [] other: Patient Education: [x] Review HEP    [] Progressed/Changed HEP based on:   [] positioning   [] body mechanics   [] transfers   [] heat/ice application    [] other:      Other Objective/Functional Measures: Static balance EC x 30 sec w/o LOB. Pt complained of right knee pain. Pt was guarded with ROM during today's session . Cues to decrease right Tibial ER during Ex's.   -trial of KT/webbing to decrease pain and improve Stability. Pain Level (0-10 scale) post treatment: 0/10    ASSESSMENT/Changes in Function: Pt progressing steadily. Pt requesting to wear a knee brace in the classroom due to her right knee buckling. Trial of KT had positive results. Pt recommended to remove if any skin irritation.     Patient will continue to benefit from skilled PT services to modify and progress therapeutic interventions, address functional mobility deficits, address ROM deficits, address strength deficits, analyze and address soft tissue restrictions, analyze and cue movement patterns, analyze and modify body mechanics/ergonomics, assess and modify postural abnormalities, address imbalance/dizziness, and instruct in home and community integration to attain remaining goals.      []  See Plan of Care  [x]  See progress note/recertification  []  See Discharge Summary         Progress towards goals / Updated goals:      PLAN  []  Upgrade activities as tolerated     []  Continue plan of care  []  Update interventions per flow sheet       []  Discharge due to:_  []  Other:_      Beck Ray, PT 8/22/2022  11:25 AM    Future Appointments   Date Time Provider Gracie Mckeon   8/29/2022  3:00 PM Teresita Childers [place] [facility]   8/31/2022  5:15 PM Na Wacissa, PT [facility]   9/6/2022  6:00 PM Na Wacissa, PT [facility]   9/8/2022  6:00 PM Teresita Childers [place] [facility]   9/12/2022  2:30 PM [address]   9/13/2022  6:00 PM Na Wacissa, PT [facility]   9/14/2022  5:15 PM Na Wacissa, PT [facility]   9/19/2022  5:15 PM Na Wacissa, PT [facility]   9/21/2022  5:15 PM Na Wacissa, [facility]

## 2022-08-26 ENCOUNTER — APPOINTMENT (OUTPATIENT)
Dept: PHYSICAL THERAPY | Age: 62
End: 2022-08-26
Payer: COMMERCIAL

## 2022-08-29 ENCOUNTER — APPOINTMENT (OUTPATIENT)
Dept: PHYSICAL THERAPY | Age: 62
End: 2022-08-29
Payer: COMMERCIAL

## 2022-08-31 ENCOUNTER — TELEPHONE (OUTPATIENT)
Dept: PHYSICAL THERAPY | Age: 62
End: 2022-08-31

## 2022-09-06 ENCOUNTER — HOSPITAL ENCOUNTER (OUTPATIENT)
Dept: PHYSICAL THERAPY | Age: 62
Discharge: HOME | End: 2022-09-06
Payer: COMMERCIAL

## 2022-09-06 PROCEDURE — 97530 THERAPEUTIC ACTIVITIES: CPT

## 2022-09-06 PROCEDURE — 97110 THERAPEUTIC EXERCISES: CPT

## 2022-09-06 PROCEDURE — 97112 NEUROMUSCULAR REEDUCATION: CPT

## 2022-09-06 NOTE — PROGRESS NOTES
PT DAILY TREATMENT NOTE     Patient Name: Kiran Lopez  Date:2022  : 1960  [x]  Patient  Verified  Payor: Maggy Found / Plan: Dulce Cooler / Product Type: HMO /    In time:601  Out time:655  Total Treatment Time (min): 54  Visit #: 6 of 12    Medicare/BCBS Only   Total Timed Codes (min):  54 1:1 Treatment Time:  54       Treatment Area: Right knee pain [M25.561]  Weakness of right lower extremity [R29.898]    SUBJECTIVE  Pain Level (0-10 scale): 3/10 knee/right  Any medication changes, allergies to medications, adverse drug reactions, diagnosis change, or new procedure performed?: [x] No    [] Yes (see summary sheet for update)  Subjective functional status/changes:   [] No changes reported  Reports she went back to school today and is tired and knee is sore and achy.  KT fell off after last session    OBJECTIVE    30 min Therapeutic Exercise:  [] See flow sheet :   Rationale: increase ROM, increase strength, and improve coordination to improve the patients ability to ease with adl's    15 min Therapeutic Activity:  []  See flow sheet :FOTO/ MMT/Goals   Rationale: increase ROM, increase strength, and improve coordination  to improve the patients ability to determine ability of progression of program     10 min Neuromuscular Re-education:  []  See flow sheet :   Rationale: increase ROM, increase strength, and improve balance  to improve the patients ability to decrease fall risk    With   [] TE   [] TA   [] neuro   [] other: Patient Education: [x] Review HEP    [] Progressed/Changed HEP based on:   [] positioning   [] body mechanics   [] transfers   [] heat/ice application    [] other:      Other Objective/Functional Measures: FOTO=40  MSR= EO/EC  MMT Hip flexion/Knee flexion = 4-/5, 3+/5  50% improved s far  AROM =8-70 deg, PROM=3-90 deg (pain)  Pain Level (0-10 scale) post treatment: 3/10     ASSESSMENT/Changes in Function: see PN    Patient will continue to benefit from skilled PT services to modify and progress therapeutic interventions, address functional mobility deficits, address ROM deficits, address strength deficits, analyze and address soft tissue restrictions, analyze and cue movement patterns, analyze and modify body mechanics/ergonomics, assess and modify postural abnormalities, address imbalance/dizziness, and instruct in home and community integration to attain remaining goals. []  See Plan of Care  [x]  See progress note/recertification  []  See Discharge Summary         Progress towards goals / Updated goals:  Short Term Goals: To be accomplished in 1 weeks:  1. Patient will report compliance with initial HEP to optimize therapy outcomes. Status at eval: established  Current: Met. Long Term Goals: To be accomplished in 6 weeks:  1. Patient will improve FOTO score by at least 5 points in order to demonstrate functional improvement. Status at eval: 63/100               2. Patient will report no more than \"little difficulty\" with \"standing for 1 hour\" with FOTO in order to demonstrate improved tolerance to work tasks. Status at eval: \"moderate difficulty\"  Not met               3. Patient will improve right hip and knee flex MMT to at least 4/5 with MMT in order to perform functional tasks with increased ease. Status at eval: hip flex 3-/4, knee flex 3+/5  Progressing. 4. Patient will improve right knee flex AROM to at least 95 deg in order to navigate stairs with increased ease. Status at eval: 76 deg  Not Met Progressing               5. Patient will be able to maintain MSR stance for at least 30 sec B in order to perform self care tasks with increased ease. Status at eval: 10 sec B  MET.        PLAN  [x]  Upgrade activities as tolerated     [x]  Continue plan of care  []  Update interventions per flow sheet       [] Discharge due to:_  []  Other:_      Miki Gregorio, MIRIAM 9/6/2022  5:56 PM    Future Appointments   Date Time Provider Gracie Linda   9/6/2022  6:00 PM Lovena Dates, PT [facility]   9/8/2022  6:00 PM Teresita Smith [place] [facility]   9/12/2022  2:30 PM [facility]   9/13/2022  6:00 PM Lovena Dates, PT [facility]   9/14/2022  5:15 PM Lovena Dates, PT [facility]   9/19/2022  5:15 PM Lovena Dates, PT [facility]   9/21/2022  5:15 PM Lovena Dates, PT [facility]

## 2022-09-08 ENCOUNTER — HOSPITAL ENCOUNTER (OUTPATIENT)
Dept: PHYSICAL THERAPY | Age: 62
Discharge: HOME | End: 2022-09-08
Payer: COMMERCIAL

## 2022-09-08 PROCEDURE — 97110 THERAPEUTIC EXERCISES: CPT

## 2022-09-08 NOTE — PROGRESS NOTES
PT DAILY TREATMENT NOTE     Patient Name: Angelo Hernandez  Date:2022  : 1960  [x]  Patient  Verified  Payor: Leona Angel / Plan: Scarlet December / Product Type: HMO /    In time:5:58P  Out time:6:40P  Total Treatment Time (min): 42  Visit #: 1 of 12    Medicare/BCBS Only   Total Timed Codes (min):  42 1:1 Treatment Time:  39       Treatment Area: Right knee pain [M25.561]  Weakness of right lower extremity [R29.898]    SUBJECTIVE  Pain Level (0-10 scale): 0/10  Any medication changes, allergies to medications, adverse drug reactions, diagnosis change, or new procedure performed?: [x] No    [] Yes (see summary sheet for update)  Subjective functional status/changes:   [] No changes reported  Patient reports she felt good after last visit. She still wants to work on lifting her leg better. 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 She has 3 stairs to enter her home and goes up sideways. OBJECTIVE      42 min Therapeutic Exercise:  [x] See flow sheet :   Rationale: increase ROM, increase strength, improve coordination, improve balance, and increase proprioception to improve the patients ability to perform daily tasks with increased ease            With   [] TE   [] TA   [] neuro   [] other: Patient Education: [x] Review HEP    [] Progressed/Changed HEP based on:   [] positioning   [] body mechanics   [] transfers   [] heat/ice application    [] other:      Other Objective/Functional Measures:     /80     Pain to B knees with step ups to 4\" step and unilateral parallel bar  Very min challenge with MSR stance   Intermittent UE support with tandem stance  Challenged with seated dumbbell step overs     Pain Level (0-10 scale) post treatment: 0/10    ASSESSMENT/Changes in Function: Patient making slow, steady progress towards goals. She demonstrates improved overall static standing balance since start of care.  She demonstrates continued decreased overall endurance and fatigues with activities for hip flex strengthening. She exhibits knee pain with ascending/descending stairs (more on left) but is able to perform step ups from small step with only unilateral UE support and good overall control. Patient will continue to benefit from skilled PT services to modify and progress therapeutic interventions, address functional mobility deficits, address ROM deficits, address strength deficits, analyze and address soft tissue restrictions, analyze and cue movement patterns, analyze and modify body mechanics/ergonomics, assess and modify postural abnormalities, address imbalance/dizziness, and instruct in home and community integration to attain remaining goals. Progress towards goals / Updated goals:  1. Patient will improve FOTO score by at least 5 points in order to demonstrate functional improvement. Status at PN: 40/100=decline                  2. Patient will report no more than \"little difficulty\" with \"standing for 1 hour\" with FOTO in order to demonstrate improved tolerance to work tasks. Status at eval: \"moderate difficulty\"                  3. Patient will improve right hip and knee flex MMT to at least 4/5 with MMT in order to perform functional tasks with increased ease. Status at PN: hip flex 4-/5, knee flex 3+/5                  4. Patient will improve right knee flex AROM to at least 95 deg in order to navigate stairs with increased ease. Status at PN: 70 deg                  5. Patient will be able to maintain MSR EC  for at least 30 sec B in order to perform self care tasks with increased ease.                             Status at PN: MSR EOx 30 sec  MET-9/9/22    PLAN  [x]  Upgrade activities as tolerated     [x]  Continue plan of care  []  Update interventions per flow sheet       []  Discharge due to:_  []  Other:_      Valere Severance, PT 9/8/2022  10:23 AM    Future Appointments   Date Time Provider Gracie Mckeon   9/8/2022  6:00 PM Teresita Avina [place] [facility]   9/12/2022  2:30 PM [facility]   9/13/2022  6:00 PM Read Pluck, PT [facility]   9/14/2022  5:15 PM Read Pluck, PT [facility]   9/19/2022  5:15 PM Read Pluck, PT [facility]   9/21/2022  5:15 PM Read Pluck, PT [facility]

## 2022-09-13 ENCOUNTER — HOSPITAL ENCOUNTER (OUTPATIENT)
Dept: PHYSICAL THERAPY | Age: 62
Discharge: HOME | End: 2022-09-13
Payer: COMMERCIAL

## 2022-09-13 PROCEDURE — 97112 NEUROMUSCULAR REEDUCATION: CPT

## 2022-09-13 PROCEDURE — 97110 THERAPEUTIC EXERCISES: CPT

## 2022-09-13 NOTE — PROGRESS NOTES
PT DAILY TREATMENT NOTE     Patient Name: Estefanía Singer  Date:2022  : 1960  [x]  Patient  Verified  Payor: Rubi Martinez / Plan: Lizzette Blake / Product Type: HMO /    In time:6:03P  Out time:6:45P  Total Treatment Time (min): 42  Visit #: 2 of 12    Medicare/BCBS Only   Total Timed Codes (min):  42 1:1 Treatment Time:  42       Treatment Area: Right knee pain [M25.561]  Weakness of right lower extremity [R29.898]    SUBJECTIVE  Pain Level (0-10 scale): 0/10  Any medication changes, allergies to medications, adverse drug reactions, diagnosis change, or new procedure performed?: [x] No    [] Yes (see summary sheet for update)  Subjective functional status/changes:   [] No changes reported  Patient reports she felt good after last visit. She just got out of work and was rushing to get here.     OBJECTIVE        17 min Therapeutic Exercise:  [] See flow sheet :   Rationale: increase ROM, increase strength, and increase proprioception to improve the patients ability to perform ADLs with increased ease       25 min Neuromuscular Re-education:  []  See flow sheet : gait with mirror feedback, standing balance, Russel exercises for core/posture   Rationale: increase ROM, increase strength, improve coordination, improve balance, and increase proprioception  to improve the patients ability to perform ADLs with increased ease            With   [] TE   [] TA   [] neuro   [] other: Patient Education: [x] Review HEP    [] Progressed/Changed HEP based on:   [] positioning   [] body mechanics   [] transfers   [] heat/ice application    [] other:      Other Objective/Functional Measures:      /88  Ambulating with SPC on left and left lateral trunk lean and decreased right arm swing  Mechanics improved with verbal and visual cues and mirror feedback  Improved range with seated dumbbell stepovers  Elevated cane 1 notch-patient reported positive subjective response  Intermittent tapping of hands on parallel bars for balance standing on airex and with vision removed    Pain Level (0-10 scale) post treatment: 0/10    ASSESSMENT/Changes in Function: Patient demonstrates improved overall static standing balance with narrowed SISSY and standing on uneven surfaces. She demonstrates improved ambulation mechanics following education for upright posture and increasing right arm swing. She continues to demonstrate improving activity tolerance with LE strengthening. Patient will continue to benefit from skilled PT services to modify and progress therapeutic interventions, address functional mobility deficits, address ROM deficits, address strength deficits, analyze and address soft tissue restrictions, analyze and cue movement patterns, analyze and modify body mechanics/ergonomics, assess and modify postural abnormalities, address imbalance/dizziness, and instruct in home and community integration to attain remaining goals. Progress towards goals / Updated goals:  1. Patient will improve FOTO score by at least 5 points in order to demonstrate functional improvement. Status at PN: 40/100=decline                  2. Patient will report no more than \"little difficulty\" with \"standing for 1 hour\" with FOTO in order to demonstrate improved tolerance to work tasks. Status at eval: \"moderate difficulty\"                  3. Patient will improve right hip and knee flex MMT to at least 4/5 with MMT in order to perform functional tasks with increased ease. Status at PN: hip flex 4-/5, knee flex 3+/5                  4. Patient will improve right knee flex AROM to at least 95 deg in order to navigate stairs with increased ease. Status at PN: 70 deg                  5. Patient will be able to maintain MSR EC  for at least 30 sec B in order to perform self care tasks with increased ease.                             Status at PN: MSR EOx 30 sec  MET-9/9/22       PLAN  [x]  Upgrade activities as tolerated     [x]  Continue plan of care  []  Update interventions per flow sheet       []  Discharge due to:_  []  Other:_      Robert Weiner, PT 9/13/2022  1:17 PM    Future Appointments   Date Time Provider Gracie Mckeon   9/13/2022  6:00 PM Boubacar Lia [facility]   9/14/2022  5:15 PM Hill Gray, PT [facility]   9/19/2022  5:15 PM Hill Gray, PT [facility]   9/21/2022  5:15 PM Hill Gray, PT [facility]

## 2022-09-14 ENCOUNTER — HOSPITAL ENCOUNTER (OUTPATIENT)
Dept: PHYSICAL THERAPY | Age: 62
Discharge: HOME | End: 2022-09-14
Payer: COMMERCIAL

## 2022-09-14 PROCEDURE — 97110 THERAPEUTIC EXERCISES: CPT

## 2022-09-14 PROCEDURE — 97530 THERAPEUTIC ACTIVITIES: CPT

## 2022-09-14 PROCEDURE — 97112 NEUROMUSCULAR REEDUCATION: CPT

## 2022-09-14 NOTE — PROGRESS NOTES
PT DAILY TREATMENT NOTE - [facility]     Patient Name: Caitie Ramirez  Date:2022  : 1960  [x]  Patient  Verified  Payor: Lizzette Fees / Plan: Adrian Silva / Product Type: HMO /    In time:525  Out time:620  Total Treatment Time (min): 55  Visit #: 3 of 12    Treatment Area: Right knee pain [M25.561]  Weakness of right lower extremity [R29.898]    SUBJECTIVE  Pain Level (0-10 scale): 0/10  Any medication changes, allergies to medications, adverse drug reactions, diagnosis change, or new procedure performed?: [x] No    [] Yes (see summary sheet for update)  Subjective functional status/changes:   [] No changes reported  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 Reports she has also been eating a lot better and her BP is improved. OBJECTIVE    25 min Therapeutic Exercise:  [] See flow sheet :   Rationale: increase ROM, increase strength, improve coordination, and improve balance to improve the patients ability to ease with adl's    10 min Therapeutic Activity:  []  See flow sheet :   Rationale: increase ROM, increase strength, and improve coordination  to improve the patients ability to ease with functional activities     20 min Neuromuscular Re-education:  []  See flow sheet :   Rationale: increase ROM, increase strength, improve coordination, and improve balance  to improve the patients ability to ease with safe ambulation. With   [] TE   [] TA   [] neuro   [] other: Patient Education: [x] Review HEP    [] Progressed/Changed HEP based on:   [] positioning   [] body mechanics   [] transfers   [] heat/ice application    [] other:      Other Objective/Functional Measures: amb with SPC Obstacle course: foam, small ludwin, 4 in step. - pt able to complete session w/o rest break.  -ambulating with improved gait with cane adjusted. - limited arm swing on right and limited ROM as well as  strength. with elevation since stroke.         Pain Level (0-10 scale) post treatment: 0/10    ASSESSMENT/Changes in Function: Progressing well. Pt was able to tolerate all standing ex's w/o rest break. Pt was given orange putty for  strengthening activities for her right hand. 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 Patient will continue to benefit from skilled PT services to modify and progress therapeutic interventions, address functional mobility deficits, address ROM deficits, address strength deficits, analyze and address soft tissue restrictions, analyze and cue movement patterns, analyze and modify body mechanics/ergonomics, assess and modify postural abnormalities, address imbalance/dizziness, and instruct in home and community integration to attain remaining goals. []  See Plan of Care  [x]  See progress note/recertification  []  See Discharge Summary         Progress towards goals / Updated goals:  1. Patient will improve FOTO score by at least 5 points in order to demonstrate functional improvement. Status at PN: 40/100=decline                  2. Patient will report no more than \"little difficulty\" with \"standing for 1 hour\" with FOTO in order to demonstrate improved tolerance to work tasks. Status at eval: \"moderate difficulty\"                  3. Patient will improve right hip and knee flex MMT to at least 4/5 with MMT in order to perform functional tasks with increased ease. Status at PN: hip flex 4-/5, knee flex 3+/5                  4. Patient will improve right knee flex AROM to at least 95 deg in order to navigate stairs with increased ease. Status at PN: 70 deg                  5. Patient will be able to maintain MSR EC  for at least 30 sec B in order to perform self care tasks with increased ease.                             Status at PN: MSR EOx 30 sec  MET-9/9/22          PLAN  []  Upgrade activities as tolerated     []  Continue plan of care  []  Update interventions per flow sheet       []  Discharge due to:_  []  Other:_      Johnathon Esposito, PT 9/14/2022  5:42 PM    Future Appointments   Date Time Provider Gracie Mckeon   9/19/2022  5:15 PM Tracy Reyes, PT [facility]   9/21/2022  5:15 PM Tracy Reyes PT [facility]

## 2022-09-19 ENCOUNTER — HOSPITAL ENCOUNTER (OUTPATIENT)
Dept: PHYSICAL THERAPY | Age: 62
Discharge: HOME | End: 2022-09-19
Payer: COMMERCIAL

## 2022-09-19 PROCEDURE — 97110 THERAPEUTIC EXERCISES: CPT

## 2022-09-19 PROCEDURE — 97112 NEUROMUSCULAR REEDUCATION: CPT

## 2022-09-19 NOTE — PROGRESS NOTES
PT DAILY TREATMENT NOTE     Patient Name: Angelo Hernandez  Date:2022  : 1960  [x]  Patient  Verified  Payor: Leona Angel / Plan: Scarlet December / Product Type: HMO /    In time:5:55  Out time:6:35  Total Treatment Time (min): 40  Visit #: 4 of 12    Medicare/BCBS Only   Total Timed Codes (min):  40 1:1 Treatment Time:  40       Treatment Area: Right knee pain [M25.561]  Weakness of right lower extremity [R29.898]    SUBJECTIVE  Pain Level (0-10 scale): 2/10  Any medication changes, allergies to medications, adverse drug reactions, diagnosis change, or new procedure performed?: [x] No    [] Yes (see summary sheet for update)  Subjective functional status/changes:   [] No changes reported  Pt reports that she's getting better with therapy. She still uses cane for community negotiation. Pt denies any abnormal symptoms.         OBJECTIVE  22 min Therapeutic Exercise:  [x] See flow sheet :   Rationale: increase ROM, increase strength, and increase proprioception to improve the patients ability to improve ease of ADL's and daily tasks    18 min Neuromuscular Re-education:  [x]  See flow sheet : Standing balance interventions, dynamic gait    Rationale: increase strength, improve balance, and increase proprioception  to improve the patients ability to reduce fall risk and improve ease/safety with daily tasks        With   [x] TE   [] TA   [x] neuro   [] other: Patient Education: [x] Review HEP    [] Progressed/Changed HEP based on:   [] positioning   [] body mechanics   [] transfers   [] heat/ice application    [x] other: educated patient regarding dangers of elevated BP, signs/sx of heart attack/stroke and go to emergency room if signs/sx, advised monitoring BP 4x per day and keep a log and follow up with MD if BP remained elevated; reviewed 3 systems of balance      Other Objective/Functional Measures:     /90 taken manually prior to session  Held bike d/t elevated BP this visit Dynamic Gait: ambulation with horizontal/vertical head turns, ambulation with EC, backward ambulation, carioca, tandem walking     CGA with gait belt for dynamic gait. No LOB during dynamic gait. Veering with gait with EC at times     /90 taken manually following session    Pain Level (0-10 scale) post treatment: 0/10    ASSESSMENT/Changes in Function:     Pt is making slow, steady progress towards updated goals in therapy. Strength and balance continue to improve, evidenced by improving ease with therapy interventions. Pt presented with asymptomatic elevated BP this visit and was advised to monitor BP and follow up with MD if BP remained elevated. Will continue to address strength, ROM, flexibility, balance, and functional mobility deficits in order to improve ease/safety with daily tasks. Patient will continue to benefit from skilled PT services to modify and progress therapeutic interventions, address functional mobility deficits, address ROM deficits, address strength deficits, analyze and address soft tissue restrictions, analyze and cue movement patterns, analyze and modify body mechanics/ergonomics, assess and modify postural abnormalities, address imbalance/dizziness, and instruct in home and community integration to attain remaining goals. Progress towards goals / Updated goals:  1. Patient will improve FOTO score by at least 5 points in order to demonstrate functional improvement. Status at PN: 40/100=decline                  2. Patient will report no more than \"little difficulty\" with \"standing for 1 hour\" with FOTO in order to demonstrate improved tolerance to work tasks. Status at eval: \"moderate difficulty\"                  3. Patient will improve right hip and knee flex MMT to at least 4/5 with MMT in order to perform functional tasks with increased ease.                             Status at PN: hip flex 4-/5, knee flex 3+/5       Current Status:  Progressing. Not formally assessed but increasing ease with therapy interventions targeting hip/knee flexion 9/19/22                  4. Patient will improve right knee flex AROM to at least 95 deg in order to navigate stairs with increased ease. Status at PN: 70 deg                  5. Patient will be able to maintain MSR EC  for at least 30 sec B in order to perform self care tasks with increased ease.                             Status at PN: MSR EOx 30 sec  MET-9/9/22       PLAN  []  Upgrade activities as tolerated     []  Continue plan of care  []  Update interventions per flow sheet       []  Discharge due to:_  []  Other:_      Sandra Raphael PT 9/19/2022  6:05 PM    Future Appointments   Date Time Provider Gracie Mckeon   9/21/2022  5:15 PM Jorje Milian PT [id number]

## 2022-09-21 ENCOUNTER — HOSPITAL ENCOUNTER (OUTPATIENT)
Dept: PHYSICAL THERAPY | Age: 62
Discharge: HOME | End: 2022-09-21
Payer: COMMERCIAL

## 2022-09-21 PROCEDURE — 97110 THERAPEUTIC EXERCISES: CPT

## 2022-09-21 PROCEDURE — 97112 NEUROMUSCULAR REEDUCATION: CPT

## 2022-09-21 NOTE — PROGRESS NOTES
PT DAILY TREATMENT NOTE     Patient Name: Laura Carson  Date:2022  : 1960  [x]  Patient  Verified  Payor: Govind Ruiz / Plan: Pat Trotter / Product Type: HMO /    In time:520 Out time:558  Total Treatment Time (min): 38  Visit #: 5 of       Treatment Area: Right knee pain [M25.561]  Weakness of right lower extremity [R29.898]    SUBJECTIVE  Pain Level (0-10 scale): 0/10  Any medication changes, allergies to medications, adverse drug reactions, diagnosis change, or new procedure performed?: [x] No    [] Yes (see summary sheet for update)  Subjective functional status/changes:   [] No changes reported  Reports she is doing well. OBJECTIVE    25 min Therapeutic Exercise:  [] See flow sheet :   Rationale: increase ROM, increase strength, improve coordination, and improve balance to improve the patients ability to ease with adl's    13 min Neuromuscular Re-education:  []  See flow sheet :   Rationale: improve coordination, improve balance, and increase proprioception  to improve the patients ability to ease with adl's    Rationale: With   [] TE   [] TA   [] neuro   [] other: Patient Education: [x] Review HEP    [] Progressed/Changed HEP based on:   [] positioning   [] body mechanics   [] transfers   [] heat/ice application    [] other:      Other Objective/Functional Measures: performed high ludwin step over with 2#  to increase hip flexion. Pt performed 5 reps each. Pain Level (0-10 scale) post treatment: 0/10    ASSESSMENT/Changes in Function: Pt is progressing well. She is challenged with functional strength and balance ex's. Pt performed seated leg lifts over erected dumbbells on floor to encourage hip IR/ER.      Patient will continue to benefit from skilled PT services to modify and progress therapeutic interventions, address functional mobility deficits, address ROM deficits, address strength deficits, analyze and address soft tissue restrictions, analyze and cue movement patterns, analyze and modify body mechanics/ergonomics, assess and modify postural abnormalities, address imbalance/dizziness, and instruct in home and community integration to attain remaining goals. []  See Plan of Care  []  See progress note/recertification  []  See Discharge Summary         Progress towards goals / Updated goals:  1. Patient will improve FOTO score by at least 5 points in order to demonstrate functional improvement. Status at PN: 40/100=decline                  2. Patient will report no more than \"little difficulty\" with \"standing for 1 hour\" with FOTO in order to demonstrate improved tolerance to work tasks. Status at eval: \"moderate difficulty\"                  3. Patient will improve right hip and knee flex MMT to at least 4/5 with MMT in order to perform functional tasks with increased ease. Status at PN: hip flex 4-/5, knee flex 3+/5                             Current Status:  Progressing. Not formally assessed but increasing ease with therapy interventions targeting hip/knee flexion 9/19/22                  4. Patient will improve right knee flex AROM to at least 95 deg in order to navigate stairs with increased ease. Status at PN: 70 deg                  5. Patient will be able to maintain MSR EC  for at least 30 sec B in order to perform self care tasks with increased ease. Status at PN: MSR EOx 30 sec  MET-9/9/22          PLAN  [x]  Upgrade activities as tolerated     [x]  Continue plan of care  []  Update interventions per flow sheet       []  Discharge due to:_  []  Other:_      Johnathon Esposito, PT 9/21/2022  5:27 PM    No future appointments.

## 2022-12-20 NOTE — THERAPY DISCHARGE
[facility]  [address] [place] [address]  [phone] fax    Physical Therapy Discharge Summary    Patient name: Mechelle Botello Start of Care: 2022   Referral source: No ref. 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 Patient will improve FOTO score by at least 5 points in order to demonstrate functional improvement. Status at PN: 40/100=decline                  2. Patient will report no more than \"little difficulty\" with \"standing for 1 hour\" with FOTO in order to demonstrate improved tolerance to work tasks. Status at eval: \"moderate difficulty\"                  3. Patient will improve right hip and knee flex MMT to at least 4/5 with MMT in order to perform functional tasks with increased ease. Status at PN: hip flex 4-/5, knee flex 3+/5                             Current Status:  Progressing. Not formally assessed but increasing ease with therapy interventions targeting hip/knee flexion 22                  4. Patient will improve right knee flex AROM to at least 95 deg in order to navigate stairs with increased ease.                             Status at PN: 70 deg                  5. Patient will be able to maintain MSR EC  for at least 30 sec B in order to perform self care tasks with increased ease. Status at PN: MSR EOx 30 sec  MET-9/9/22     ASSESSMENT/RECOMMENDATIONS: Pt DC' d with goals progressing.        [x]Discontinue therapy: []Patient has reached or is progressing toward set goals      []Patient is non-compliant or has abdicated      []Due to lack of appreciable progress towards set goals    Catie Servin, PT 12/20/2022 4:33 PM

## 2023-05-03 ENCOUNTER — HOSPITAL ENCOUNTER (OUTPATIENT)
Facility: HOSPITAL | Age: 63
Discharge: HOME | End: 2023-05-06
Payer: COMMERCIAL

## 2023-05-03 DIAGNOSIS — D24.2 BENIGN NEOPLASM OF LEFT BREAST: ICD-10-CM

## 2023-05-03 DIAGNOSIS — Z98.890 OTHER SPECIFIED POSTPROCEDURAL STATES: ICD-10-CM

## 2023-05-03 PROCEDURE — 76642 ULTRASOUND BREAST LIMITED: CPT

## 2023-05-03 PROCEDURE — 77066 DX MAMMO INCL CAD BI: CPT

## 2024-08-02 ENCOUNTER — HOSPITAL ENCOUNTER (OUTPATIENT)
Facility: HOSPITAL | Age: 64
Setting detail: RECURRING SERIES
Discharge: HOME | End: 2024-08-05
Payer: COMMERCIAL

## 2024-08-02 PROCEDURE — 92610 EVALUATE SWALLOWING FUNCTION: CPT

## 2024-08-02 NOTE — PROGRESS NOTES
[facility]  [address] [place] [address]  Ph: [phone] Fax: [phone]    Plan of Care/ Statement of Necessity for Speech Therapy Services    Patient Name: Jose Borges : 1960   Medical   Diagnosis: Dysphagia [R13.10] Treatment Diagnosis: R13.12 Dysphagia, oropharyngeal phase   Onset Date: 24      Referral Source: Magi You APRN * Start of Care (SOC): 24   Prior Hospitalization: See medical history Provider #: [id number]   Prior Level of Function: All aspects of speech/language, cognition, voice, and swallowing WNLs, per pt report    Comorbidities: Bell's palsy, suspected transient ischemic attack, hypertensive emergency, hx of HTN, CVA, hyperlipidemia Neurologic condition     Medications: Verified on Patient Summary List     Payor: [facility] / [facility] / Product Type: *No Product type* /     Treatment Plan may include any combination of the following: Dysphagia Treatment and Patient Education 33275 SWALLOWING EVALUATION (Epic-Bedside Swallowing Eval) and 28584 SWALLOW TREATMENT (Epic- Dysphagia Therapy)    The Plan of Care and following information is based on the information from the initial evaluation.  Assessment/ key information:   This 62yo female pt with hx of prior CVA was referred for OP ST evaluation by PCP d/t difficulties with swallowing 2* Bell's Palsy. Pt was admitted to the hospital on 24 d/t slurred speech and facial droop. MRI of the brain was unremarkable for any acute ischemia, suspected TIA. Pt was seen by hospital SLP for mild-moderately dysfluent speech c/b brief pauses in the middle of speech, some \"choppy\" speech impacting word-final phonemes, occasional phoneme or syllable repetitions within words, mild consonant distortions. Pt's swallowing was also assessed with thin liquids via cup/straw and regular solids without overt s/sx of aspiration/penetration, oral phase 
Alternate liquid swallows every 2-3 bites (c)  [] Falsetto/laryngeal elevation exercises (f)  [] Discourage liquid wash between bites (c)  [] Thermal application (c,f)  [] Multiple swallows     [] Sour bolus (f)  [] Patient needs cues     [] Cold bolus (f)  [] Patient does not need cues   [] Pharyngeal exercise (f)  [] Mendelsohn Maneuver (c,f)    [] Breath hold  [x] Encourage/stimulate lip closure (c)   [x] Effortful Swallow (c,f)  [x] Empty mouth before next bite (c)   [] Tongue base retraction  [] Cue patient to slow down (c)    [] Tongue hold  [] Encourage coughing (c)    [] Laryngeal closure  []Chin tuck (c)  []Head turn  (c)  []Head turn , chin tuck (c)    Patient/Caregiver instruction/education: [] Review HEP    [] Progressed/Changed    HEP/Handouts given: Results of evaluation; POC including targeted goals, frequency, and duration; diet recommendations and rationale; and aspiration precautions.     Pain Level (0-10 scale) post treatment: 0    ASSESSMENT  [x]  See Plan of Care     PLAN  [x]  Upgrade activities as tolerated     []  Continue plan of care  []  Discharge due to:__  [x] Other: initiate tx with pt/caregiver and SLP agreed upon goals, frequency, and duration of ST services.      Maki Khan M.A., CCC-SLP  Speech Language Pathologist   8/2/2024  7:24 AM

## 2024-08-05 ENCOUNTER — HOSPITAL ENCOUNTER (OUTPATIENT)
Facility: HOSPITAL | Age: 64
Setting detail: RECURRING SERIES
End: 2024-08-05
Payer: COMMERCIAL

## 2024-08-12 ENCOUNTER — TELEPHONE (OUTPATIENT)
Facility: HOSPITAL | Age: 64
End: 2024-08-12

## 2024-08-12 ENCOUNTER — HOSPITAL ENCOUNTER (OUTPATIENT)
Facility: HOSPITAL | Age: 64
Setting detail: RECURRING SERIES
End: 2024-08-12
Payer: COMMERCIAL

## 2024-08-12 NOTE — TELEPHONE ENCOUNTER
SLP left VM. Pt with 2nd cxl this date. Pt also requested via VM to cxl remaining appts. Pt informed during SLP VM re: attendance policy. Requested pt to call back if she would like to reschedule, make more appointments, be put on hold, or be d/c from OP ST. Left name, title, and call back number.

## 2024-08-19 ENCOUNTER — APPOINTMENT (OUTPATIENT)
Facility: HOSPITAL | Age: 64
End: 2024-08-19
Payer: COMMERCIAL

## 2024-08-26 ENCOUNTER — APPOINTMENT (OUTPATIENT)
Facility: HOSPITAL | Age: 64
End: 2024-08-26
Payer: COMMERCIAL

## 2024-08-26 ENCOUNTER — TELEPHONE (OUTPATIENT)
Facility: HOSPITAL | Age: 64
End: 2024-08-26

## 2024-08-26 NOTE — TELEPHONE ENCOUNTER
SLP left  re: pt's attendance and continuation for OP ST. Informed pt that referral order for dx and tx of speech deficits 2* Bell's Palsy has been received from referring MD. Pt last seen 8/2/24. Informed pt re: 30-day non-attendance policy and requested pt reach out to clinic by 9/2/24.

## 2024-09-04 ENCOUNTER — TELEPHONE (OUTPATIENT)
Facility: HOSPITAL | Age: 64
End: 2024-09-04

## 2024-09-04 NOTE — TELEPHONE ENCOUNTER
3rd attempt to call pt to continue with OP ST services, left VM re: d/c from OP ST d/t non-compliance to attendance after 30-day non attendance. Pt informed she will need another referral if she would like to be re-referred to OP ST. Left name, title, and call back number.